# Patient Record
Sex: MALE | Race: ASIAN | Employment: FULL TIME | URBAN - METROPOLITAN AREA
[De-identification: names, ages, dates, MRNs, and addresses within clinical notes are randomized per-mention and may not be internally consistent; named-entity substitution may affect disease eponyms.]

---

## 2020-01-07 ENCOUNTER — OFFICE VISIT (OUTPATIENT)
Dept: FAMILY MEDICINE CLINIC | Age: 45
End: 2020-01-07
Payer: COMMERCIAL

## 2020-01-07 VITALS
DIASTOLIC BLOOD PRESSURE: 92 MMHG | OXYGEN SATURATION: 98 % | HEART RATE: 68 BPM | WEIGHT: 206.2 LBS | SYSTOLIC BLOOD PRESSURE: 126 MMHG | BODY MASS INDEX: 31.25 KG/M2 | HEIGHT: 68 IN

## 2020-01-07 PROBLEM — B18.1 CHRONIC VIRAL HEPATITIS B WITHOUT DELTA AGENT AND WITHOUT COMA (HCC): Status: ACTIVE | Noted: 2020-01-07

## 2020-01-07 PROBLEM — E66.9 OBESITY: Status: ACTIVE | Noted: 2020-01-07

## 2020-01-07 PROBLEM — I10 ESSENTIAL HYPERTENSION: Status: ACTIVE | Noted: 2020-01-07

## 2020-01-07 LAB
A/G RATIO: 1.8 (ref 1.1–2.2)
ALBUMIN SERPL-MCNC: 4.8 G/DL (ref 3.4–5)
ALP BLD-CCNC: 70 U/L (ref 40–129)
ALT SERPL-CCNC: 35 U/L (ref 10–40)
ANION GAP SERPL CALCULATED.3IONS-SCNC: 17 MMOL/L (ref 3–16)
AST SERPL-CCNC: 25 U/L (ref 15–37)
BILIRUB SERPL-MCNC: 0.7 MG/DL (ref 0–1)
BUN BLDV-MCNC: 9 MG/DL (ref 7–20)
CALCIUM SERPL-MCNC: 9.7 MG/DL (ref 8.3–10.6)
CHLORIDE BLD-SCNC: 101 MMOL/L (ref 99–110)
CHOLESTEROL, TOTAL: 147 MG/DL (ref 0–199)
CO2: 23 MMOL/L (ref 21–32)
CREAT SERPL-MCNC: 0.8 MG/DL (ref 0.9–1.3)
GFR AFRICAN AMERICAN: >60
GFR NON-AFRICAN AMERICAN: >60
GLOBULIN: 2.7 G/DL
GLUCOSE BLD-MCNC: 89 MG/DL (ref 70–99)
HDLC SERPL-MCNC: 41 MG/DL (ref 40–60)
LDL CHOLESTEROL CALCULATED: 91 MG/DL
POTASSIUM SERPL-SCNC: 5.1 MMOL/L (ref 3.5–5.1)
SODIUM BLD-SCNC: 141 MMOL/L (ref 136–145)
TOTAL PROTEIN: 7.5 G/DL (ref 6.4–8.2)
TRIGL SERPL-MCNC: 75 MG/DL (ref 0–150)
VLDLC SERPL CALC-MCNC: 15 MG/DL

## 2020-01-07 PROCEDURE — 99204 OFFICE O/P NEW MOD 45 MIN: CPT | Performed by: FAMILY MEDICINE

## 2020-01-07 RX ORDER — MULTIVIT-MIN/IRON/FOLIC ACID/K 18-600-40
CAPSULE ORAL
COMMUNITY

## 2020-01-07 SDOH — SOCIAL STABILITY: SOCIAL NETWORK
DO YOU BELONG TO ANY CLUBS OR ORGANIZATIONS SUCH AS CHURCH GROUPS UNIONS, FRATERNAL OR ATHLETIC GROUPS, OR SCHOOL GROUPS?: PATIENT DECLINED

## 2020-01-07 SDOH — SOCIAL STABILITY: SOCIAL NETWORK: IN A TYPICAL WEEK, HOW MANY TIMES DO YOU TALK ON THE PHONE WITH FAMILY, FRIENDS, OR NEIGHBORS?: PATIENT DECLINED

## 2020-01-07 SDOH — HEALTH STABILITY: MENTAL HEALTH
STRESS IS WHEN SOMEONE FEELS TENSE, NERVOUS, ANXIOUS, OR CAN'T SLEEP AT NIGHT BECAUSE THEIR MIND IS TROUBLED. HOW STRESSED ARE YOU?: TO SOME EXTENT

## 2020-01-07 SDOH — HEALTH STABILITY: MENTAL HEALTH: HOW OFTEN DO YOU HAVE A DRINK CONTAINING ALCOHOL?: 2-4 TIMES A MONTH

## 2020-01-07 SDOH — SOCIAL STABILITY: SOCIAL NETWORK: HOW OFTEN DO YOU ATTENT MEETINGS OF THE CLUB OR ORGANIZATION YOU BELONG TO?: PATIENT DECLINED

## 2020-01-07 SDOH — HEALTH STABILITY: PHYSICAL HEALTH: ON AVERAGE, HOW MANY DAYS PER WEEK DO YOU ENGAGE IN MODERATE TO STRENUOUS EXERCISE (LIKE A BRISK WALK)?: 2 DAYS

## 2020-01-07 SDOH — SOCIAL STABILITY: SOCIAL NETWORK: ARE YOU MARRIED, WIDOWED, DIVORCED, SEPARATED, NEVER MARRIED, OR LIVING WITH A PARTNER?: PATIENT DECLINED

## 2020-01-07 SDOH — SOCIAL STABILITY: SOCIAL NETWORK: HOW OFTEN DO YOU GET TOGETHER WITH FRIENDS OR RELATIVES?: PATIENT DECLINED

## 2020-01-07 SDOH — SOCIAL STABILITY: SOCIAL NETWORK: HOW OFTEN DO YOU ATTEND CHURCH OR RELIGIOUS SERVICES?: PATIENT DECLINED

## 2020-01-07 ASSESSMENT — ENCOUNTER SYMPTOMS
BLOOD IN STOOL: 0
ABDOMINAL PAIN: 0

## 2020-01-07 ASSESSMENT — PATIENT HEALTH QUESTIONNAIRE - PHQ9
SUM OF ALL RESPONSES TO PHQ QUESTIONS 1-9: 0
SUM OF ALL RESPONSES TO PHQ9 QUESTIONS 1 & 2: 0
2. FEELING DOWN, DEPRESSED OR HOPELESS: 0
SUM OF ALL RESPONSES TO PHQ QUESTIONS 1-9: 0
1. LITTLE INTEREST OR PLEASURE IN DOING THINGS: 0

## 2020-01-07 NOTE — PROGRESS NOTES
appears well-developed and well-nourished   Ears, Nose, Mouth & Throat: external inspection of ears and nose show no scars, lesions or masses, pt can hear finger rub bilaterally  Neck: neck is supple. No thyromegaly present   Cardiovascular: Normal rate. No lower ext edema present  Respiratory: Effort normal and breath sounds normal. No respiratory distress. No W/R/R  Gastrointestinal: Soft. There is no tenderness. No hernias  Musculoskeletal: Normal range of motion of extremities, normal gait  Psychiatric: judgment and insight appropriate for age, no agitation  Skin: Skin is warm and dry     No flowsheet data found. No results found for: CHOL, CHOLFAST, TRIG, TRIGLYCFAST, HDL, LDLCHOLESTEROL, LDLCALC, GLUF, GLUCOSE, LABA1C    The ASCVD Risk score (Kalpana Pineda., et al., 2013) failed to calculate for the following reasons: The systolic blood pressure is missing    Cannot find a previous HDL lab    Cannot find a previous total cholesterol lab      There is no immunization history on file for this patient. Health Maintenance   Topic Date Due    DTaP/Tdap/Td vaccine (1 - Tdap) 12/06/1986    HIV screen  12/06/1990    Lipid screen  12/06/2015    Flu vaccine (1) 09/01/2019    Pneumococcal 0-64 years Vaccine  Aged Out       ASSESSMENT/PLAN:    Screening blood work as below  Colonoscopy/Papsmear up to date  tobacco screening neg  Tdap  Flu shot    Sebastian was seen today for establish care and annual exam.    Diagnoses and all orders for this visit:    Healthcare maintenance        Return in about 1 year (around 1/7/2021). An electronic signature was used to authenticate this note.     --Maryse Bell MD on 1/7/2020 at 9:10 AM

## 2020-01-07 NOTE — PROGRESS NOTES
2-4 times a month    Drug use: Never    Sexual activity: Yes   Lifestyle    Physical activity:     Days per week: 2 days     Minutes per session: Not on file    Stress: To some extent   Relationships    Social connections:     Talks on phone: Patient refused     Gets together: Patient refused     Attends [employer] service: Patient refused     Active member of club or organization: Patient refused     Attends meetings of clubs or organizations: Patient refused     Relationship status: Patient refused    Intimate partner violence:     Fear of current or ex partner: Not on file     Emotionally abused: Not on file     Physically abused: Not on file     Forced sexual activity: Not on file   Other Topics Concern    Not on file   Social History Narrative    Not on file        Family History   Problem Relation Age of Onset    Hypertension Father        Vitals:    01/07/20 0913 01/07/20 0915 01/07/20 0942   BP: (!) 142/100 (!) 132/90 (!) 126/92   Pulse: 68     SpO2: 98%     Weight: 206 lb 3.2 oz (93.5 kg)     Height: 5' 8\" (1.727 m)       Estimated body mass index is 31.35 kg/m² as calculated from the following:    Height as of this encounter: 5' 8\" (1.727 m). Weight as of this encounter: 206 lb 3.2 oz (93.5 kg). Physical Exam  Constitutional: Patient appears obese  Ears, Nose, Mouth & Throat: external inspection of ears and nose show no scars, lesions or masses, pt can hear finger rub bilaterally  Eyes: Conjunctivae and pupils are normal in appearance   Neck: neck is supple. No thyromegaly present   Cardiovascular: Normal rate. No lower ext edema present  Respiratory: Effort normal and breath sounds normal. No respiratory distress. No W/R/R   Gastrointestinal: Soft. There is no tenderness.  No hernias  Musculoskeletal: Normal range of motion of extremities, normal gait  Psychiatric: judgment and insight appropriate for age, no agitation  Skin: Skin is warm and dry     ASSESSMENT/PLAN:  Rony Chaidez was seen today for establish care and annual exam.    Diagnoses and all orders for this visit:    Essential hypertension  Above goal, pt declines starting medication, he will work on diet and exercise    Chronic viral hepatitis B without delta agent and without coma ([facility]  -     Comprehensive Metabolic Panel  -     Demetri Bull MD, Gastroenterology, [facility]    Obesity, unspecified classification, unspecified obesity type, unspecified whether serious comorbidity present  Diet, exercise    Blurry vision  -     AFL - Danielito Greenberg MD, Ophthalmology, [facility]  -     Hemoglobin A1C  -     Lipid Panel    Return in about 6 months (around 7/7/2020) for bp check. An  electronic signature was used to authenticate this note.     --Rebecca Cervantes MD on 1/7/2020 at 9:53 AM

## 2020-01-08 LAB
ESTIMATED AVERAGE GLUCOSE: 79.6 MG/DL
HBA1C MFR BLD: 4.4 %

## 2020-01-21 ENCOUNTER — INITIAL CONSULT (OUTPATIENT)
Dept: GASTROENTEROLOGY | Age: 45
End: 2020-01-21
Payer: COMMERCIAL

## 2020-01-21 ENCOUNTER — HOSPITAL ENCOUNTER (OUTPATIENT)
Age: 45
Discharge: HOME | End: 2020-01-21
Payer: MEDICARE

## 2020-01-21 VITALS
HEIGHT: 68 IN | WEIGHT: 207 LBS | BODY MASS INDEX: 31.37 KG/M2 | SYSTOLIC BLOOD PRESSURE: 128 MMHG | DIASTOLIC BLOOD PRESSURE: 88 MMHG

## 2020-01-21 PROCEDURE — 99204 OFFICE O/P NEW MOD 45 MIN: CPT | Performed by: INTERNAL MEDICINE

## 2020-01-21 PROCEDURE — 86701 HIV-1ANTIBODY: CPT

## 2020-01-21 PROCEDURE — 86707 HEPATITIS BE ANTIBODY: CPT

## 2020-01-21 PROCEDURE — 87390 HIV-1 AG IA: CPT

## 2020-01-21 PROCEDURE — 36415 COLL VENOUS BLD VENIPUNCTURE: CPT

## 2020-01-21 PROCEDURE — 87341 HEP B SURFACE AG NEUTRLZJ IA: CPT

## 2020-01-21 PROCEDURE — 86702 HIV-2 ANTIBODY: CPT

## 2020-01-21 PROCEDURE — 87350 HEPATITIS BE AG IA: CPT

## 2020-01-21 PROCEDURE — 87517 HEPATITIS B DNA QUANT: CPT

## 2020-01-21 PROCEDURE — 87912 NFCT AGT GNTYP ALYS HEP B: CPT

## 2020-01-21 PROCEDURE — 86704 HEP B CORE ANTIBODY TOTAL: CPT

## 2020-01-21 PROCEDURE — 80074 ACUTE HEPATITIS PANEL: CPT

## 2020-01-21 NOTE — PROGRESS NOTES
insert only the list prior to this encounter.)  Current Outpatient Rx   Medication Sig Dispense Refill    MILK THISTLE PO Take by mouth      Cholecalciferol (VITAMIN D) 50 MCG (2000 UT) CAPS capsule Take by mouth      vitamin k 100 MCG tablet Take 100 mcg by mouth daily       ALLERGIES   No Known Allergies  IMMUNIZATIONS     There is no immunization history on file for this patient. REVIEW OF SYSTEMS   See HPI for further details and pertinent postiives. Negative for the following:  Constitutional: Negative for weight change. Negative for appetite change and fatigue. HENT: Negative for nosebleeds, sore throat, mouth sores, and voice change. Respiratory: Negative for cough, choking and chest tightness. Cardiovascular: Negative for chest pain   Gastrointestinal: See HPI  Musculoskeletal: Negative for arthralgias. Skin: Negative for pallor. Neurological: Negative for weakness and light-headedness. Hematological: Negative for adenopathy. Does not bruise/bleed easily. Psychiatric/Behavioral: Negative for suicidal ideas. PHYSICAL EXAM   VITAL SIGNS: /88 (Site: Right Upper Arm, Position: Sitting, Cuff Size: Small Adult)   Ht 5' 8\" (1.727 m)   Wt 207 lb (93.9 kg)   BMI 31.47 kg/m²   Wt Readings from Last 3 Encounters:   01/21/20 207 lb (93.9 kg)   01/07/20 206 lb 3.2 oz (93.5 kg)     Constitutional: Well developed, Well nourished, No acute distress, Non-toxic appearance. HENT: Normocephalic, Atraumatic, Bilateral external ears normal, Oropharynx moist, No oral exudates, Nose normal.   Eyes: Conjunctiva normal, No discharge. Neck: Normal range of motion, No tenderness, Supple, No stridor. Lymphatic: No cervical, subclavian, or axillary lymphadenopathy. Cardiovascular: Normal heart rate, Normal rhythm, No murmurs, No rubs, No gallops. Thorax & Lungs: Normal breath sounds, No respiratory distress, No wheezing, No chest tenderness. No gynecomastia.   Abdomen: scars consistent with stated generally administer a nucleos(t)maryanne analogue. We recommend tenofovir or entecavir rather than other nucleos(t)maryanne analogues (Grade 1B). Tenofovir and entecavir have potent antiviral activity, and are at low risk of selecting for drug-resistant virus. PegIFN may also be reasonable as an initial agent for certain treatment-naïve patients without cirrhosis, particularly if they have genotype A infection and/or they do not wish to be on long-term treatment. However, PegIFN is typically associated with more side effects compared with nucleos(t)maryanne analogues. For most patients who are initiating therapy with tenofovir, we recommend tenofovir alafenamide (25 mg daily), if available, rather than tenofovir disoproxil fumarate (300 mg daily) (Grade 1B). Although there is more experience with tenofovir disoproxil fumarate, tenofovir alafenamide appears to be equally effective and is associated with less renal and bone toxicity. In addition, for most patients who were originally started on tenofovir disoproxil fumarate, we suggest switching to tenofovir alafenamide (Grade 2B). There are special treatment considerations when choosing an antiviral agent for patients with decompensated cirrhosis or reduced kidney function, and for women who are pregnant. Patients should be monitored while on therapy to assess for virologic response and medication toxicity. Most patients receiving nucleos(t)maryanne analogue therapy will require at least four to five years of treatment, and some may require indefinite treatment. The management of patients with persistent viremia or breakthrough infection depends upon the viral load and the antiviral agent that was used. Tenofovir- or entecavir-resistant virus is unlikely to emerge in treatment-naïve patients, and most cases of treatment failure are due to poor adherence. However, on rare occasion, a patient may need to be transitioned to an alternative agent.     By contrast, drug-resistant virus is likely to develop in patients failing therapy with nucleos(t)maryanne analogues, such as lamivudine, adefovir, or telbivudine. For patients with persistent viremia or breakthrough infection on one of these agents, we recommend tenofovir rather than entecavir (Grade 1B). Tenofovir is effective in suppressing HBV replication in this setting, whereas entecavir should generally be avoided since there is a high risk of entecavir resistance developing in patients with pre-existing drug-resistant virus after lamivudine or telbivudine treatment. Patients with chronic HBV should receive counseling on ways to prevent worsening liver disease (eg, avoid alcohol use, hepatitis A vaccination) and to reduce transmission to others. In addition, screening for hepatocellular carcinoma is indicated for certain high-risk patients. Periodic screening for hepatocellular carcinoma [facility]) should be performed according to the American Association for the Study of Liver Diseases guidelines. This includes patients who are at increased risk for [address] (eg, patients with cirrhosis,  men >40 years,  women >50 years, Africans/North American blacks, patients with a family history of [address]  patients with a high viral load and active inflammation for several years). [id number]   Return for test results per phone/W[place].           Rusty Glass 1/21/20 12:39 PM    CC:  Amarilys Acevedo MD

## 2020-01-21 NOTE — PATIENT INSTRUCTIONS
[address]      [facility] ,  [address] [facility]  Phone: [phone] [facility] [address], [address]  Phone: [phone]   HFW:[phone]    INTRODUCTION - The term \"hepatitis\" is used to describe a common form of liver injury. Hepatitis simply means \"inflammation of the liver\" (the suffix \"itis\" means inflammation and \"hepa\" means liver). Hepatitis B is a specific type of hepatitis that is caused by a virus. It is estimated that there are more than 300 million carriers of the hepatitis B virus in the world, with over 500,000 dying annually from hepatitis B-related liver disease. Fortunately, treatments for chronic hepatitis B are available, and many new treatments are in development. Vaccines can prevent hepatitis B infection, and are now given routinely to newborns and children in the [place] and in many other countries. HOW DID I BECOME INFECTED WITH HEPATITIS B? - There are several ways to become infected with hepatitis B virus. Contaminated needles - Using contaminated needles can spread the hepatitis B virus. This includes tattooing, acupuncture, and ear piercing (if these procedures are performed with contaminated instruments). Sharing needles or syringes can also spread the virus. Sex - Sexual contact with someone who is infected is one of the most common ways to become infected with hepatitis B. If you are infected with hepatitis B, make sure your spouse or sex partner gets vaccinated. Mother to infant - Hepatitis B can be passed from a mother to her baby during or shortly after delivery. Having a  delivery (also called a ) does not prevent the virus from spreading. Experts believe that breastfeeding is safe. During pregnancy, all women should have a blood test for a marker of hepatitis B virus, called hepatitis B surface antigen (HBsAg). Normally, the HBsAg should be negative.     If the chance of complications. HEPATITIS B TREATMENT - Specific treatment for acute hepatitis B is usually not needed since in about 95 percent of adults, the immune system controls the infection and gets rid of the virus within about six months. In people who develop chronic hepatitis, an antiviral medication might be recommended to reduce or reverse liver damage and to prevent long-term complications of hepatitis B. Antiviral therapy - Several antiviral medicines are available to treat hepatitis B. Not all people with hepatitis B need immediate treatment. If you do not start treatment immediately, you will be monitored over time to know when hepatitis becomes more active (meaning that antiviral treatment should begin). Once you start treatment, you will have regular monitoring to see how well the treatment is working, monitor for side effects or drug resistance, and monitor for signs that the infections has come back after finishing treatment. Lamivudine - Lamivudine (Epivir-HBV®) is effective in decreasing hepatitis B virus activity and ongoing liver inflammation. It is safe in patients with liver failure and long-term treatment can decrease the risk of liver failure and liver cancer. Lamivudine is taken by mouth, usually at a dosage of 100 mg/day. The major problem with lamivudine is that a resistant form of hepatitis B virus (referred to as a YMDD mutant) frequently develops in people who take lamivudine long term. Other medicines are available that are less likely to cause resistance. Adefovir - Adefovir (Hepsera®) is an alternative initial choice for people who have detectable hepatitis B virus activity and ongoing liver inflammation. An advantage of adefovir compared to lamivudine is that resistance to adefovir is less likely to develop. In addition, adefovir can suppress lamivudine-resistant HBV. Adefovir is taken by mouth, at a dosage of 10 mg/day, for at least one year.  Most patients will need long-term treatment to maintain control of the hepatitis B virus. Adefovir is a weak antiviral medicine, and resistance does occur over time. Other medicines are available that are more potent. Entecavir - Entecavir (Baraclude®) is generally more potent than lamivudine and adefovir. Resistance to entecavir is uncommon in people who have never been treated with antivirals, but occurs in up to 50 percent of people who have used lamivudine. Entecavir is taken by mouth, at a dosage of 0.5 mg daily for patients who have no prior treatment and 1.0 mg daily for patients who have resistance to lamivudine. Most patients will need long-term treatment to maintain control of the hepatitis B virus. Tenofovir - Tenofovir (Viread®) is more potent than adefovir. Resistance to tenofovir is rare. Tenofovir is taken by mouth, at a dosage of 300 mg daily. Tenofovir is effective in suppressing hepatitis B virus that is resistant to lamivudine, telbivudine, or entecavir. Tenofovir is not as effective in patients with adefovir-resistant hepatitis B. Resistance to tenofovir is uncommon. Telbivudine - Telbivudine (Mellisa Parkers) is more potent than lamivudine and adefovir. Resistance to telbivudine is common, and hepatitis B virus that is resistant to lamivudine is also resistant to telbivudine. Telbivudine is taken by mouth at a dosage of 600 mg daily. Other medicines are available that are less likely to cause resistance. Interferon-alpha - Interferon-alpha is an appropriate treatment for people with chronic hepatitis B infection who have detectable virus activity, ongoing liver inflammation, and no cirrhosis. Both conventional interferon and pegylated interferon are approved in the [place]. Interferon-alpha may be considered in young patients who do not have advanced liver disease and do not wish to be on long-term treatment.  Interferon-alpha is not appropriate for people with cirrhosis who have liver failure or for people who have a recurrence of hepatitis after liver transplantation. Interferon is given for a finite duration. Pegylated interferon, a long acting interferon taken once a week, is given for one year. This is in contrast to the other hepatitis treatments, which are given for many years until a desired response is achieved. Drug resistance to interferon has not been reported. The disadvantages of interferon-alpha are that it must be taken by injection and it can cause many side effects. Liver transplantation - Liver transplantation may be the only option for people who have developed advanced cirrhosis. The liver transplantation process is elaborate, involving an extensive screening process to ensure that a person is a good candidate. Thus, not all patients with cirrhosis are eligible, and only those with the most advanced cirrhosis or early stage liver cancer and otherwise good medical and social conditions will be put on the transplant waiting list.    TIPS TO MAINTAIN LIVER HEALTH - As discussed above, the majority of people with acute hepatitis B spontaneously clear the infection. Those who develop chronic infection should see a doctor with expertise in liver disease (usually a gastroenterologist or hepatologist) who can discuss treatment options. Vaccinations - Everyone with chronic hepatitis B should be vaccinated against hepatitis A unless they are known to be immune. Influenza vaccination is recommended once per year, usually in the fall. Patients with liver disease should also receive standard immunizations, including a diphtheria and tetanus booster, every ten years. Liver cancer screening - Regular screening for liver cancer is also recommended, particularly for older individuals, those with cirrhosis, and patients with a family history of liver cancer. In general, this includes an ultrasound examination of the liver every six months in patients with cirrhosis.     Diet - No specific diet has been shown sores and cuts with a bandage. Do not donate blood, body organs, other tissues, or sperm. Immediate family and household members should be tested for hepatitis B. Anyone who is at risk of hepatitis B infection should be vaccinated. Do not share any injection drug equipment (needles, cotton swabs, syringes). Clean blood spills with a mixture of 1 part household bleach to 9 parts water. Hepatitis B cannot be spread by:        Hugging or kissing      Sharing eating utensils or cups      Sneezing or coughing      Breastfeeding    WHERE TO GET MORE INFORMATION - Your healthcare provider is the best source of information for questions and concerns related to your medical problem. This article will be updated as needed every four months on our web site ([email or web address]/patients). The following organizations also provide reliable health information. [employer]          (www.nlm.nih.gov/medlineplus/healthtopics. html)        Centers for Disease Control          (www.cdc.gov/ncidod/diseases/hepatitis/index. htm)        [employer] of Diabetes and Digestive and Kidney Diseases          (www.niddk.nih.gov)        [employer] Data of Allergy and Infectious Diseases          (www.niaid.nih.gov/)        [employer] for Infectious Diseases          (www.nfid.org)        American Association for Study of Liver Diseases          (www.aasld. org)        American Gastroenterological Association          (www.gastro. org)        [facility]          (www.liverfoundation. org)        The Hepatitis B Foundation          (www.hepb.org)

## 2020-01-22 LAB
HAV IGM SER IA-ACNC: ABNORMAL
HEPATITIS B CORE IGM ANTIBODY: ABNORMAL
HEPATITIS B CORE TOTAL ANTIBODY: POSITIVE
HEPATITIS B SURFACE ANTIGEN INTERPRETATION: REACTIVE
HEPATITIS C ANTIBODY INTERPRETATION: ABNORMAL
HIV AG/AB: NORMAL
HIV ANTIGEN: NORMAL
HIV-1 ANTIBODY: NORMAL
HIV-2 AB: NORMAL

## 2020-01-23 LAB
MISCELLANEOUS LAB TEST ORDER: NORMAL
MISCELLANEOUS LAB TEST RESULT: ABNORMAL

## 2020-01-24 LAB
HEPATITIS B SURFACE ANTIGEN CONFIRMATION: POSITIVE
HEPATITIS BE ANTIBODY: POSITIVE
HEPATITIS BE ANTIGEN: POSITIVE

## 2020-01-25 PROCEDURE — 87912 NFCT AGT GNTYP ALYS HEP B: CPT

## 2020-01-27 RX ORDER — ENTECAVIR 0.5 MG/1
0.5 TABLET, FILM COATED ORAL DAILY
Qty: 30 TABLET | Refills: 1 | Status: SHIPPED | OUTPATIENT
Start: 2020-01-27 | End: 2020-03-12 | Stop reason: SDUPTHER

## 2020-03-12 ENCOUNTER — TELEPHONE (OUTPATIENT)
Dept: GASTROENTEROLOGY | Age: 45
End: 2020-03-12

## 2020-03-12 RX ORDER — ENTECAVIR 0.5 MG/1
0.5 TABLET, FILM COATED ORAL DAILY
Qty: 30 TABLET | Refills: 3 | Status: SHIPPED | OUTPATIENT
Start: 2020-03-12 | End: 2020-04-08 | Stop reason: SDUPTHER

## 2020-03-13 NOTE — TELEPHONE ENCOUNTER
Take another month, don't stop, have a viral load drawn and then follow up a few days later. We can not cure the infection. Goal of treatment is to suppress the infection so it is not detectible. To this end the done sometimes needs to be increased.

## 2020-04-09 RX ORDER — ENTECAVIR 0.5 MG/1
0.5 TABLET, FILM COATED ORAL DAILY
Qty: 30 TABLET | Refills: 3 | Status: SHIPPED | OUTPATIENT
Start: 2020-04-09 | End: 2020-04-14 | Stop reason: SDUPTHER

## 2020-04-13 ENCOUNTER — TELEPHONE (OUTPATIENT)
Dept: GASTROENTEROLOGY | Age: 45
End: 2020-04-13

## 2020-04-13 NOTE — TELEPHONE ENCOUNTER
Pt called in wanting a 90 day refill of Entecavir sent to [facility] at [address] [facility], [place], [address] [place] [address].

## 2020-04-14 RX ORDER — ENTECAVIR 0.5 MG/1
0.5 TABLET, FILM COATED ORAL DAILY
Qty: 90 TABLET | Refills: 0 | Status: SHIPPED | OUTPATIENT
Start: 2020-04-14 | End: 2020-05-13 | Stop reason: SDUPTHER

## 2020-05-13 RX ORDER — ENTECAVIR 0.5 MG/1
0.5 TABLET, FILM COATED ORAL DAILY
Qty: 90 TABLET | Refills: 0 | Status: SHIPPED | OUTPATIENT
Start: 2020-05-13 | End: 2020-08-07 | Stop reason: SDUPTHER

## 2020-05-20 ENCOUNTER — HOSPITAL ENCOUNTER (OUTPATIENT)
Age: 45
Discharge: HOME | End: 2020-05-20
Payer: MEDICARE

## 2020-05-20 PROCEDURE — 87517 HEPATITIS B DNA QUANT: CPT

## 2020-05-23 LAB
HBV QNT LOG, IU/ML: <1 LOG IU/ML
HBV QNT, IU/ML: ABNORMAL IU/ML
INTERPRETATION: DETECTED

## 2020-08-07 RX ORDER — ENTECAVIR 0.5 MG/1
0.5 TABLET, FILM COATED ORAL DAILY
Qty: 90 TABLET | Refills: 1 | Status: SHIPPED | OUTPATIENT
Start: 2020-08-07 | End: 2020-09-09 | Stop reason: SDUPTHER

## 2020-09-08 ENCOUNTER — TELEPHONE (OUTPATIENT)
Dept: GASTROENTEROLOGY | Age: 45
End: 2020-09-08

## 2020-09-08 NOTE — TELEPHONE ENCOUNTER
Pt. Calls in for a refill of entecavir . 5 mg    Asks for [facility] [address], not [facility] [address].

## 2020-09-09 RX ORDER — ENTECAVIR 0.5 MG/1
0.5 TABLET, FILM COATED ORAL DAILY
Qty: 90 TABLET | Refills: 1 | Status: SHIPPED | OUTPATIENT
Start: 2020-09-09 | End: 2020-10-12 | Stop reason: SDUPTHER

## 2020-09-09 RX ORDER — ENTECAVIR 0.5 MG/1
0.5 TABLET, FILM COATED ORAL DAILY
Qty: 90 TABLET | Refills: 1 | Status: SHIPPED | OUTPATIENT
Start: 2020-09-09 | End: 2020-09-09 | Stop reason: SDUPTHER

## 2020-09-09 NOTE — TELEPHONE ENCOUNTER
[facility] will not fill RX for patient.  Sent to [address] per patient request

## 2020-10-09 ENCOUNTER — TELEPHONE (OUTPATIENT)
Dept: GASTROENTEROLOGY | Age: 45
End: 2020-10-09

## 2020-10-12 RX ORDER — ENTECAVIR 0.5 MG/1
0.5 TABLET, FILM COATED ORAL DAILY
Qty: 90 TABLET | Refills: 0 | Status: SHIPPED | OUTPATIENT
Start: 2020-10-12 | End: 2020-11-17 | Stop reason: SDUPTHER

## 2020-11-17 ENCOUNTER — OFFICE VISIT (OUTPATIENT)
Dept: GASTROENTEROLOGY | Age: 45
End: 2020-11-17
Payer: COMMERCIAL

## 2020-11-17 ENCOUNTER — HOSPITAL ENCOUNTER (OUTPATIENT)
Age: 45
Discharge: HOME | End: 2020-11-17
Payer: COMMERCIAL

## 2020-11-17 VITALS
HEIGHT: 68 IN | TEMPERATURE: 97.6 F | BODY MASS INDEX: 31.67 KG/M2 | SYSTOLIC BLOOD PRESSURE: 124 MMHG | DIASTOLIC BLOOD PRESSURE: 84 MMHG | WEIGHT: 209 LBS

## 2020-11-17 PROCEDURE — 80076 HEPATIC FUNCTION PANEL: CPT

## 2020-11-17 PROCEDURE — 99213 OFFICE O/P EST LOW 20 MIN: CPT | Performed by: INTERNAL MEDICINE

## 2020-11-17 PROCEDURE — 36415 COLL VENOUS BLD VENIPUNCTURE: CPT

## 2020-11-17 RX ORDER — ENTECAVIR 0.5 MG/1
0.5 TABLET, FILM COATED ORAL DAILY
Qty: 90 TABLET | Refills: 3 | Status: SHIPPED | OUTPATIENT
Start: 2020-11-17 | End: 2021-02-26 | Stop reason: SDUPTHER

## 2020-11-17 NOTE — PROGRESS NOTES
Rocio Garcia    [address] [facility] ,  [address] [facility]  Phone: [phone] [address]     Chief Complaint   Patient presents with    Follow-up     med check        HPI (location/symptom, timing/onset, duration, quality/severity, context, modifying factors, and associated signs/symptoms)     Thank you Jason Gaines MD for asking me to see Tristian Gruber in consultation. He is a  [2]  [4] 40 y.o. . male seen independently who presents with the following GI complaints:    Tristian Gruber  Is here for annual follow up of chbv. He does complain of some ruq discomfort and admits to a little weight gain. Modifying factors - Started entecavir/baraclude last year and viral load was undetectable in May on present dosage after 4 months but a pretreatment viral load of 32,000. Transaminses presently normal.  Denies side effects of the medication. No pertinent GI family history. There is no previous fibroscan or recent ultrasound documented. Last Encounter Reviewed: 1/21/2020 Tristian Gruber developed some form of acute hepatitis while in [facility] in 2008. Told he has hepatitis B. His mother and whole side of the family have it. His parents still live in [facility]. The only set of liver enzymes I have are normal.  His mother is on entecavir. Pertinent PMH, FH, SH is reviewed below. Last EGD: none  Last Colonoscopy: none    Review of available records reveals:   Wt Readings from Last 50 Encounters:   11/17/20 209 lb (94.8 kg)   01/21/20 207 lb (93.9 kg)   01/07/20 206 lb 3.2 oz (93.5 kg)       No components found for: HGBA1C  BP Readings from Last 3 Encounters:   11/17/20 124/84   01/21/20 128/88   01/07/20 (!) 126/92     Health Maintenance   Topic Date Due    Hepatitis A vaccine (1 of 2 - Risk 2-dose series) 12/06/1976    Pneumococcal 0-64 years Vaccine (1 of 1 - PPSV23) 12/06/1981    Hepatitis B vaccine (1 of 3 - Risk 3-dose series) 12/06/1994    SURGICAL HISTORY   No past surgical history on file. CURRENT MEDICATIONS   (This list may include medications prescribed during this encounter as epic can not insert only the list prior to this encounter.)  Current Outpatient Rx   Medication Sig Dispense Refill    entecavir (BARACLUDE) 0.5 MG tablet Take 1 tablet by mouth daily 90 tablet 3    MILK THISTLE PO Take by mouth      Cholecalciferol (VITAMIN D) 50 MCG (2000 UT) CAPS capsule Take by mouth      vitamin k 100 MCG tablet Take 100 mcg by mouth daily       ALLERGIES   No Known Allergies  IMMUNIZATIONS     There is no immunization history on file for this patient. REVIEW OF SYSTEMS (2-9 systems for level 4, 10 or more for level 5)   See HPI for further details and pertinent postiives. Negative for the following:  Constitutional: Negative for weight change. Negative for appetite change and fatigue. HENT: Negative for nosebleeds, sore throat, mouth sores, and voice change. Respiratory: Negative for cough, choking and chest tightness. Cardiovascular: Negative for chest pain   Gastrointestinal: No heartburn, bloating, dysphagia, cough, chest pain, globus, regurgitation, diarrhea, constipation, nausea, or vomiting. Positive for ruq discomfort. Musculoskeletal: Negative for arthralgias. Skin: Negative for pallor. Neurological: Negative for weakness and light-headedness. Hematological: Negative for adenopathy. Does not bruise/bleed easily. Psychiatric/Behavioral: Negative for suicidal ideas. PHYSICAL EXAM   VITAL SIGNS: /84   Temp 97.6 °F (36.4 °C)   Ht 5' 8\" (1.727 m)   Wt 209 lb (94.8 kg)   BMI 31.78 kg/m²   Wt Readings from Last 3 Encounters:   11/17/20 209 lb (94.8 kg)   01/21/20 207 lb (93.9 kg)   01/07/20 206 lb 3.2 oz (93.5 kg)     Constitutional: Well developed, Well nourished, No acute distress, Non-toxic appearance.    HENT: Normocephalic, Atraumatic, Bilateral external ears normal, Oropharynx moist, No oral exudates, Nose normal.   Eyes: Conjunctiva normal, No discharge. Neck: Normal range of motion, No tenderness, Supple, No stridor. Lymphatic: No cervical, subclavian, or axillary lymphadenopathy. Cardiovascular: Normal heart rate, Normal rhythm, No murmurs, No rubs, No gallops. Thorax & Lungs: Normal breath sounds, No respiratory distress, No wheezing, No chest tenderness. No gynecomastia. Abdomen/GI: scars consistent with stated surgeries, no hernias, no HSM, soft NTND   Rectal:  Deferred. Skin: Warm, Dry, No erythema, No rash. No bruising. No spider hemangiomas. Back: No tenderness, No CVA tenderness. Lower Extremities: Intact distal pulses, No edema, No tenderness, No cyanosis, No clubbing. Neurologic: Alert & oriented x 3, Normal motor function, Normal sensory function, No focal deficits noted. No asterixis. RADIOLOGY/PROCEDURES       FINAL IMPRESSION     Orders Placed This Encounter   Procedures    US GALLBLADDER RUQ     Standing Status:   Future     Standing Expiration Date:   11/17/2021    Hepatic Function Panel     Standing Status:   Future     Number of Occurrences:   1     Standing Expiration Date:   12/17/2020     Yvonne Noramn was seen today for follow-up. Diagnoses and all orders for this visit:    RUQ pain  -     Hepatic Function Panel; Future  -     US GALLBLADDER RUQ; Future    Chronic viral hepatitis B without delta agent and without coma (HCC)  -     entecavir (BARACLUDE) 0.5 MG tablet; Take 1 tablet by mouth daily  -     Hepatic Function Panel; Future      Will repeat blood work in May including DNA, sAg, and eAg. Hepatitis B virus (HBV) is a double-stranded DNA virus belonging to the family of hepadnaviruses. It is estimated that there are more than 250 million HBV carriers in the world, of whom approximately 600,000 die annually from HBV-related liver disease. The diagnosis of chronic HBV infection is based upon the persistence of HBsAg for more than six months.  The management of chronic HBV infection is complex and depends upon multiple factors including clinical variables (eg, the presence or absence of liver inflammation and/or cirrhosis), the patient's immunologic response to infection (eg, hepatitis B e antigen [HBeAg]), virologic factors (eg, HBV viral load and genotype), and risk factors for disease progression (eg, age >43, family history of hepatocellular carcinoma)    The decision to initiate treatment is primarily based upon the presence or absence of cirrhosis, the alanine aminotransferase (ALT) level, and the HBV DNA level. There are additional indications for patients with certain concurrent conditions, such as malignancy and pregnancy. Patients who are not deemed to be treatment candidates at presentation, and those who decide to defer treatment, should undergo monitoring of liver biochemical tests, HBV DNA, and HBeAg status since liver disease and/or HBV replication may become active later. The goals of antiviral therapy are suppression of HBV DNA, loss of HBeAg (in patients who were initially HBeAg-positive), and loss of HBsAg. Antiviral agents for chronic HBV include pegylated interferon (PegIFN) or nucleos(t)maryanne analogs. For treatment-naïve patients who initiate therapy, we generally administer a nucleos(t)maryanne analogue. We recommend tenofovir or entecavir rather than other nucleos(t)maryanne analogues (Grade 1B). Tenofovir and entecavir have potent antiviral activity, and are at low risk of selecting for drug-resistant virus. PegIFN may also be reasonable as an initial agent for certain treatment-naïve patients without cirrhosis, particularly if they have genotype A infection and/or they do not wish to be on long-term treatment. However, PegIFN is typically associated with more side effects compared with nucleos(t)maryanne analogues.     For most patients who are initiating therapy with tenofovir, we recommend tenofovir alafenamide (25 mg daily), if available, rather than tenofovir disoproxil fumarate (300 mg daily) (Grade 1B). Although there is more experience with tenofovir disoproxil fumarate, tenofovir alafenamide appears to be equally effective and is associated with less renal and bone toxicity. In addition, for most patients who were originally started on tenofovir disoproxil fumarate, we suggest switching to tenofovir alafenamide (Grade 2B). There are special treatment considerations when choosing an antiviral agent for patients with decompensated cirrhosis or reduced kidney function, and for women who are pregnant. Patients should be monitored while on therapy to assess for virologic response and medication toxicity. Most patients receiving nucleos(t)maryanne analogue therapy will require at least four to five years of treatment, and some may require indefinite treatment. The management of patients with persistent viremia or breakthrough infection depends upon the viral load and the antiviral agent that was used. Tenofovir- or entecavir-resistant virus is unlikely to emerge in treatment-naïve patients, and most cases of treatment failure are due to poor adherence. However, on rare occasion, a patient may need to be transitioned to an alternative agent. By contrast, drug-resistant virus is likely to develop in patients failing therapy with nucleos(t)maryanne analogues, such as lamivudine, adefovir, or telbivudine. For patients with persistent viremia or breakthrough infection on one of these agents, we recommend tenofovir rather than entecavir (Grade 1B). Tenofovir is effective in suppressing HBV replication in this setting, whereas entecavir should generally be avoided since there is a high risk of entecavir resistance developing in patients with pre-existing drug-resistant virus after lamivudine or telbivudine treatment.     Patients with chronic HBV should receive counseling on ways to prevent worsening liver disease (eg, avoid alcohol use, hepatitis A vaccination) and to reduce transmission to others. In addition, screening for hepatocellular carcinoma is indicated for certain high-risk patients. Periodic screening for hepatocellular carcinoma [facility]) should be performed according to the American Association for the Study of Liver Diseases guidelines. This includes patients who are at increased risk for [address] (eg, patients with cirrhosis,  men >40 years,  women >50 years, Africans/North American blacks, patients with a family history of [address]  patients with a high viral load and active inflammation for several years). ORDERED FUTURE/PENDING TESTS     Lab Frequency Next Occurrence       FOLLOWUP   Return in about 1 year (around 11/17/2021) for after ordered tests.           Ruben Odor 11/17/20 3:24 PM EST    CC:  Allen Zamarripa MD

## 2020-11-18 LAB
ALBUMIN SERPL-MCNC: 4.5 G/DL (ref 3.4–5)
ALP BLD-CCNC: 75 U/L (ref 40–129)
ALT SERPL-CCNC: 41 U/L (ref 10–40)
AST SERPL-CCNC: 29 U/L (ref 15–37)
BILIRUB SERPL-MCNC: 1 MG/DL (ref 0–1)
BILIRUBIN DIRECT: <0.2 MG/DL (ref 0–0.3)
BILIRUBIN, INDIRECT: ABNORMAL MG/DL (ref 0–1)
TOTAL PROTEIN: 7.1 G/DL (ref 6.4–8.2)

## 2020-12-04 ENCOUNTER — HOSPITAL ENCOUNTER (OUTPATIENT)
Dept: ULTRASOUND IMAGING | Age: 45
Discharge: HOME | End: 2020-12-04
Payer: COMMERCIAL

## 2020-12-04 ENCOUNTER — HOSPITAL ENCOUNTER (OUTPATIENT)
Age: 45
Discharge: HOME | End: 2020-12-04
Payer: COMMERCIAL

## 2020-12-04 LAB — HEPATITIS B SURFACE ANTIGEN INTERPRETATION: REACTIVE

## 2020-12-04 PROCEDURE — 87340 HEPATITIS B SURFACE AG IA: CPT

## 2020-12-04 PROCEDURE — 36415 COLL VENOUS BLD VENIPUNCTURE: CPT

## 2020-12-04 PROCEDURE — 87341 HEP B SURFACE AG NEUTRLZJ IA: CPT

## 2020-12-04 PROCEDURE — 87517 HEPATITIS B DNA QUANT: CPT

## 2020-12-04 PROCEDURE — 87350 HEPATITIS BE AG IA: CPT

## 2020-12-04 PROCEDURE — 76705 ECHO EXAM OF ABDOMEN: CPT

## 2020-12-06 LAB — HEPATITIS BE ANTIGEN: POSITIVE

## 2020-12-09 NOTE — RESULT ENCOUNTER NOTE
Fatty liver can be a cause for RUQ pain. Like any other form of chronic liver disease, it can cause problems including KAUR cirrhosis over a course of 20-30 years. It is associated with other future problems like htn, hypercholesterolemia, and diabetes. Weight loss/Healthy lifestyle and vitamin E 400IU twice a day are recommended. Non invasive assessment of fibrosis can be performed with a fibroscan. Blood work will not show this. Please offer fibroscan. Additionally annual follow up and re-assessment of fibrosis every 2-3 years is recommended. He has hbv. I do not believe he has had a fibroscan so it is a good idea to do for this reason too.

## 2020-12-10 LAB — HEPATITIS B SURFACE ANTIGEN CONFIRMATION: POSITIVE

## 2020-12-13 LAB
HBV QNT LOG, IU/ML: <1 LOG IU/ML
HBV QNT, IU/ML: ABNORMAL IU/ML
INTERPRETATION: DETECTED

## 2020-12-16 NOTE — PROGRESS NOTES
PAT call placed to pt. Regarding Fibroscan scheduled on 12/17/2020 at 0730. Message left regarding npo after midnight. Pt instructed to call department if they have any implanted devices requiring a battery. Department phone number [phone]) left in message.

## 2020-12-17 ENCOUNTER — HOSPITAL ENCOUNTER (OUTPATIENT)
Dept: ENDOSCOPY | Age: 45
Discharge: HOME | End: 2020-12-17
Payer: COMMERCIAL

## 2020-12-17 PROCEDURE — 91200 LIVER ELASTOGRAPHY: CPT | Performed by: INTERNAL MEDICINE

## 2020-12-17 PROCEDURE — 91200 LIVER ELASTOGRAPHY: CPT

## 2020-12-21 ENCOUNTER — TELEPHONE (OUTPATIENT)
Dept: GASTROENTEROLOGY | Age: 45
End: 2020-12-21

## 2020-12-23 ENCOUNTER — TELEPHONE (OUTPATIENT)
Dept: GASTROENTEROLOGY | Age: 45
End: 2020-12-23

## 2020-12-23 NOTE — TELEPHONE ENCOUNTER
Pt is wondering if refill could be sent to [facility] at [facility] AT [address] please

## 2021-02-26 ENCOUNTER — TELEPHONE (OUTPATIENT)
Dept: GASTROENTEROLOGY | Age: 46
End: 2021-02-26

## 2021-02-26 DIAGNOSIS — B18.1 CHRONIC VIRAL HEPATITIS B WITHOUT DELTA AGENT AND WITHOUT COMA (HCC): ICD-10-CM

## 2021-02-26 RX ORDER — ENTECAVIR 0.5 MG/1
0.5 TABLET, FILM COATED ORAL DAILY
Qty: 90 TABLET | Refills: 0 | Status: SHIPPED | OUTPATIENT
Start: 2021-02-26 | End: 2021-04-05

## 2021-02-26 NOTE — TELEPHONE ENCOUNTER
Pt called for a refill states he only has three days left. Pt did make an appointment for 4/5/21.  Please advise

## 2021-04-05 ENCOUNTER — OFFICE VISIT (OUTPATIENT)
Dept: GASTROENTEROLOGY | Age: 46
End: 2021-04-05
Payer: COMMERCIAL

## 2021-04-05 VITALS
DIASTOLIC BLOOD PRESSURE: 88 MMHG | BODY MASS INDEX: 30.92 KG/M2 | TEMPERATURE: 97.9 F | SYSTOLIC BLOOD PRESSURE: 131 MMHG | HEIGHT: 68 IN | WEIGHT: 204 LBS | HEART RATE: 96 BPM

## 2021-04-05 DIAGNOSIS — B18.1 CHRONIC VIRAL HEPATITIS B WITHOUT DELTA AGENT AND WITHOUT COMA (HCC): Primary | ICD-10-CM

## 2021-04-05 DIAGNOSIS — R63.5 WEIGHT GAIN: ICD-10-CM

## 2021-04-05 DIAGNOSIS — K76.0 FATTY LIVER: ICD-10-CM

## 2021-04-05 PROCEDURE — 99213 OFFICE O/P EST LOW 20 MIN: CPT | Performed by: INTERNAL MEDICINE

## 2021-04-05 RX ORDER — ENTECAVIR 0.5 MG/1
0.5 TABLET, FILM COATED ORAL DAILY
Qty: 90 TABLET | Refills: 1 | Status: SHIPPED | OUTPATIENT
Start: 2021-04-05

## 2021-04-05 RX ORDER — MULTIVIT WITH MINERALS/LUTEIN
1000 TABLET ORAL DAILY
COMMUNITY

## 2021-04-05 NOTE — PROGRESS NOTES
Jonathon [address] ,  [address] [facility]  Phone: [phone]    CHIEF COMPLAINT     Chief Complaint   Patient presents with    New Patient     Mary patient, need med refill,         HPI     Carlee Tillman is a 39 y.o. male who presents for followup for chronic hepatitis B. He is a prior patient of Dr. Marlee Veliz who has now left our practice and this is his first visit scheduled with me today  He has a diagnosis of chronic hepatitis B and is on entecavir since about 15 months. Patient states he was taking entecavir regularly but has ran out of this 1 week ago and has not been taking it for 1 week  Refill was provided for him in February by me because Dr. Marlee Veliz had left but he has not picked this up  He denies any jaundice or ascites or any other significant liver related symptoms  Has mild constipation intermittently  He reports some tightness over his right side of abdomen intermittently but believes this is related to stress and comes and goes  He does not have any GI bleeding  Multiple family members have hepatitis B including his mother  He has been trying to lose weight for fatty liver but finds this very hard to do    No CBCs found  Based on labs done 1/21/2020 -hepatitis B surface antigen positive, hepatitis B E antigen positive, hepatitis B E antibody positive  Hepatitis C negative  HIV negative  His LFTs check 1/7/2020 were normal, mild elevation of ALT on labs done 12/20/2020  Could not find any pretreatment LFTs that were significantly elevated  Patient reports he had what sounds to be an acute hepatitis flare with jaundice in 2008 when he was living in [facility]  Hepatitis B DNA of 31,443 IU/ml pretreatment 1/2020  Last HBV DNA level 12/2020 on treatment has been <10 but detectable    US 12/4/2020   Increased echogenicity of liver compatible with diffuse hepatocellular  disease, hepatic steatosis.   Please correlate with liver function test. Incidental note of simple appearing cyst within the liver. Gallbladder appears unremarkable. Fibroscan - F0-F1 fibrosis    PAST MEDICAL HISTORY     Past Medical History:   Diagnosis Date    Hepatitis B     diagnosis 2008    Hypertension      FAMILY HISTORY     Family History   Problem Relation Age of Onset    Hypertension Father      SOCIAL HISTORY     Social History     Socioeconomic History    Marital status:      Spouse name: Not on file    Number of children: Not on file    Years of education: Not on file    Highest education level: Not on file   Occupational History    Not on file   Social Needs    Financial resource strain: Not on file    Food insecurity     Worry: Not on file     Inability: Not on file    Transportation needs     Medical: Not on file     Non-medical: Not on file   Tobacco Use    Smoking status: Never Smoker    Smokeless tobacco: Never Used   Substance and Sexual Activity    Alcohol use: Yes     Frequency: 2-4 times a month    Drug use: Never    Sexual activity: Yes   Lifestyle    Physical activity     Days per week: 2 days     Minutes per session: Not on file    Stress: To some extent   Relationships    Social connections     Talks on phone: Patient refused     Gets together: Patient refused     Attends Taoism service: Patient refused     Active member of club or organization: Patient refused     Attends meetings of clubs or organizations: Patient refused     Relationship status: Patient refused    Intimate partner violence     Fear of current or ex partner: Not on file     Emotionally abused: Not on file     Physically abused: Not on file     Forced sexual activity: Not on file   Other Topics Concern    Not on file   Social History Narrative    Not on file     SURGICAL HISTORY   No past surgical history on file.   CURRENT MEDICATIONS   (This list may include medications prescribed during this encounter as epic can not insert only the list prior to this encounter.)  Current Outpatient Rx   Medication Sig Dispense Refill    vitamin E 1000 units capsule Take 1,000 Units by mouth daily      entecavir (BARACLUDE) 0.5 MG tablet Take 1 tablet by mouth daily 90 tablet 0    Cholecalciferol (VITAMIN D) 50 MCG (2000 UT) CAPS capsule Take by mouth          ALLERGIES   No Known Allergies    IMMUNIZATIONS     There is no immunization history on file for this patient. REVIEW OF SYSTEMS     Constitutional: denies fever and unexpected weight change. HENT: Negative for ear pain, hearing loss and nosebleeds. Eyes: Negative for pain and visual disturbance. Respiratory: Negative for cough, shortness of breath and wheezing. Cardiovascular: Negative for chest pain, palpitations and leg swelling. Gastrointestinal: see HPI for details. Musculoskeletal: Positive for arthralgias and back pain. Skin: Negative for pallor and rash. Hematological: Negative for adenopathy. Does not bruise/bleed easily. Psychiatric/Behavioral: Negative for agitation, confusion, hallucinations. PHYSICAL EXAM   VITAL SIGNS: /88 (Site: Right Wrist, Position: Sitting, Cuff Size: Medium Adult)   Pulse 96   Temp 97.9 °F (36.6 °C) (Temporal)   Ht 5' 8\" (1.727 m)   Wt 204 lb (92.5 kg)   BMI 31.02 kg/m²   Wt Readings from Last 3 Encounters:   04/05/21 204 lb (92.5 kg)   11/17/20 209 lb (94.8 kg)   01/21/20 207 lb (93.9 kg)     Gen: AAO3, NAD  HEENT: no pallor or icterus  RS: clear to auscultation bilaterally  CVS: S1S2 RRR, no murmurs  GI: soft, nontender, not distended, BS+, no hepatosplenomegaly  Ext: no edema or clubbing    US GALLBLADDER RUQ  Narrative: EXAMINATION:  RIGHT UPPER QUADRANT ULTRASOUND    12/4/2020 7:41 am    COMPARISON:  None. HISTORY:  ORDERING SYSTEM PROVIDED HISTORY: RUQ pain    FINDINGS:  LIVER:  Liver is diffusely increased in echogenicity. Simple appearing cysts are noted the largest measuring 15 x 16 x 19 mm in  size.   No intrahepatic biliary ductal dilation is noted. BILIARY SYSTEM:  Gallbladder is unremarkable without evidence of  pericholecystic fluid, wall thickening or stones. Negative sonographic  Rosenbaum's sign. Common bile duct is within normal limits measuring 2.6 mm.    RIGHT KIDNEY: The right kidney is grossly unremarkable without evidence of  hydronephrosis. Right kidney measures 8.9 x 4.1 x 4.1 cm. PANCREAS:  Obscured by bowel gas. OTHER: No evidence of right upper quadrant ascites. Impression: Increased echogenicity of liver compatible with diffuse hepatocellular  disease, hepatic steatosis. Please correlate with liver function test.    Incidental note of simple appearing cyst within the liver. Gallbladder appears unremarkable. FINAL IMPRESSION     Bard Dunlap is a 39 y.o. male who presents for followup for chronic hepatitis B. He is a prior patient of Dr. Tasneem Stevens who has now left our practice and this is his first visit scheduled with me today  He has a diagnosis of chronic hepatitis B and is on entecavir since about 15 months. Chronic hepatitis B, eAg positive on Entecavir, placed on this by Dr. Tasneem Stevens more than 1 year ago with pretreatment hepatitis B DNA level of 31,000 international units/ml  Last LFTs 12/2020 were normal and HBV DNA <10 (but detectable)  Patient has run out of his medication 7 days ago  We have discussed that this needs to be completely avoided because there is a risk of reactivation hepatitis B if medications are abruptly discontinued  Prescription written for entecavir 0.5 mg daily to be taken every day, 90-day prescription written  Needs labs including CBC, LFTs, hepatitis B surface antigen, hepatitis B E antigen, E antibody, hepatitis B DNA level, labs ordered. We discussed the need for follow-up every 3 to 4 months for his chronic hepatitis B    Followup with Dr Lisa Thomas in 3-4 months  Patient aware I will be leaving the practice next month.